# Patient Record
Sex: MALE | Race: BLACK OR AFRICAN AMERICAN | NOT HISPANIC OR LATINO | Employment: FULL TIME | URBAN - METROPOLITAN AREA
[De-identification: names, ages, dates, MRNs, and addresses within clinical notes are randomized per-mention and may not be internally consistent; named-entity substitution may affect disease eponyms.]

---

## 2020-12-03 ENCOUNTER — LAB VISIT (OUTPATIENT)
Dept: PRIMARY CARE CLINIC | Facility: OTHER | Age: 35
End: 2020-12-03
Attending: INTERNAL MEDICINE
Payer: COMMERCIAL

## 2020-12-03 DIAGNOSIS — Z03.818 ENCOUNTER FOR OBSERVATION FOR SUSPECTED EXPOSURE TO OTHER BIOLOGICAL AGENTS RULED OUT: Primary | ICD-10-CM

## 2020-12-03 PROCEDURE — U0003 INFECTIOUS AGENT DETECTION BY NUCLEIC ACID (DNA OR RNA); SEVERE ACUTE RESPIRATORY SYNDROME CORONAVIRUS 2 (SARS-COV-2) (CORONAVIRUS DISEASE [COVID-19]), AMPLIFIED PROBE TECHNIQUE, MAKING USE OF HIGH THROUGHPUT TECHNOLOGIES AS DESCRIBED BY CMS-2020-01-R: HCPCS

## 2020-12-06 LAB — SARS-COV-2 RNA RESP QL NAA+PROBE: NOT DETECTED

## 2021-03-11 ENCOUNTER — IMMUNIZATION (OUTPATIENT)
Dept: PRIMARY CARE CLINIC | Facility: CLINIC | Age: 36
End: 2021-03-11

## 2021-03-11 DIAGNOSIS — Z23 NEED FOR VACCINATION: Primary | ICD-10-CM

## 2021-03-11 PROCEDURE — 91301 COVID-19, MRNA, LNP-S, PF, 100 MCG/0.5 ML DOSE VACCINE: CPT | Mod: S$GLB,,, | Performed by: FAMILY MEDICINE

## 2021-03-11 PROCEDURE — 0011A COVID-19, MRNA, LNP-S, PF, 100 MCG/0.5 ML DOSE VACCINE: ICD-10-PCS | Mod: CV19,S$GLB,, | Performed by: FAMILY MEDICINE

## 2021-03-11 PROCEDURE — 0011A COVID-19, MRNA, LNP-S, PF, 100 MCG/0.5 ML DOSE VACCINE: CPT | Mod: CV19,S$GLB,, | Performed by: FAMILY MEDICINE

## 2021-03-11 PROCEDURE — 91301 COVID-19, MRNA, LNP-S, PF, 100 MCG/0.5 ML DOSE VACCINE: ICD-10-PCS | Mod: S$GLB,,, | Performed by: FAMILY MEDICINE

## 2021-04-15 ENCOUNTER — IMMUNIZATION (OUTPATIENT)
Dept: PRIMARY CARE CLINIC | Facility: CLINIC | Age: 36
End: 2021-04-15

## 2021-04-15 DIAGNOSIS — Z23 NEED FOR VACCINATION: Primary | ICD-10-CM

## 2021-04-15 PROCEDURE — 91301 COVID-19, MRNA, LNP-S, PF, 100 MCG/0.5 ML DOSE VACCINE: CPT | Mod: S$GLB,,, | Performed by: FAMILY MEDICINE

## 2021-04-15 PROCEDURE — 0012A COVID-19, MRNA, LNP-S, PF, 100 MCG/0.5 ML DOSE VACCINE: ICD-10-PCS | Mod: CV19,S$GLB,, | Performed by: FAMILY MEDICINE

## 2021-04-15 PROCEDURE — 0012A COVID-19, MRNA, LNP-S, PF, 100 MCG/0.5 ML DOSE VACCINE: CPT | Mod: CV19,S$GLB,, | Performed by: FAMILY MEDICINE

## 2021-04-15 PROCEDURE — 91301 COVID-19, MRNA, LNP-S, PF, 100 MCG/0.5 ML DOSE VACCINE: ICD-10-PCS | Mod: S$GLB,,, | Performed by: FAMILY MEDICINE

## 2022-02-07 ENCOUNTER — HOSPITAL ENCOUNTER (EMERGENCY)
Facility: HOSPITAL | Age: 37
Discharge: HOME | End: 2022-02-07
Attending: EMERGENCY MEDICINE

## 2022-02-07 ENCOUNTER — OFFICE VISIT (OUTPATIENT)
Dept: OPHTHALMOLOGY | Facility: CLINIC | Age: 37
End: 2022-02-07

## 2022-02-07 VITALS
WEIGHT: 130.75 LBS | RESPIRATION RATE: 18 BRPM | DIASTOLIC BLOOD PRESSURE: 96 MMHG | TEMPERATURE: 99 F | BODY MASS INDEX: 19.88 KG/M2 | SYSTOLIC BLOOD PRESSURE: 144 MMHG | OXYGEN SATURATION: 98 % | HEART RATE: 104 BPM

## 2022-02-07 DIAGNOSIS — H16.002 CORNEAL ULCER OF LEFT EYE: ICD-10-CM

## 2022-02-07 DIAGNOSIS — H16.8 BACTERIAL KERATITIS: ICD-10-CM

## 2022-02-07 DIAGNOSIS — H16.8 BACTERIAL KERATITIS: Primary | ICD-10-CM

## 2022-02-07 DIAGNOSIS — R03.0 ELEVATED BLOOD PRESSURE READING WITHOUT DIAGNOSIS OF HYPERTENSION: ICD-10-CM

## 2022-02-07 PROCEDURE — 99999 PR PBB SHADOW E&M-EST. PATIENT-LVL II: CPT | Mod: PBBFAC,,, | Performed by: STUDENT IN AN ORGANIZED HEALTH CARE EDUCATION/TRAINING PROGRAM

## 2022-02-07 PROCEDURE — 99204 OFFICE O/P NEW MOD 45 MIN: CPT | Mod: S$GLB,,, | Performed by: STUDENT IN AN ORGANIZED HEALTH CARE EDUCATION/TRAINING PROGRAM

## 2022-02-07 PROCEDURE — 87070 CULTURE OTHR SPECIMN AEROBIC: CPT | Performed by: STUDENT IN AN ORGANIZED HEALTH CARE EDUCATION/TRAINING PROGRAM

## 2022-02-07 PROCEDURE — 87186 SC STD MICRODIL/AGAR DIL: CPT | Performed by: STUDENT IN AN ORGANIZED HEALTH CARE EDUCATION/TRAINING PROGRAM

## 2022-02-07 PROCEDURE — 99282 EMERGENCY DEPT VISIT SF MDM: CPT | Mod: ER

## 2022-02-07 PROCEDURE — 25000003 PHARM REV CODE 250: Mod: ER | Performed by: EMERGENCY MEDICINE

## 2022-02-07 PROCEDURE — 87077 CULTURE AEROBIC IDENTIFY: CPT | Performed by: STUDENT IN AN ORGANIZED HEALTH CARE EDUCATION/TRAINING PROGRAM

## 2022-02-07 PROCEDURE — 99999 PR PBB SHADOW E&M-EST. PATIENT-LVL II: ICD-10-PCS | Mod: PBBFAC,,, | Performed by: STUDENT IN AN ORGANIZED HEALTH CARE EDUCATION/TRAINING PROGRAM

## 2022-02-07 PROCEDURE — 99204 PR OFFICE/OUTPT VISIT, NEW, LEVL IV, 45-59 MIN: ICD-10-PCS | Mod: S$GLB,,, | Performed by: STUDENT IN AN ORGANIZED HEALTH CARE EDUCATION/TRAINING PROGRAM

## 2022-02-07 RX ORDER — MOXIFLOXACIN 5 MG/ML
1 SOLUTION/ DROPS OPHTHALMIC
Qty: 3 ML | Refills: 0 | Status: SHIPPED | OUTPATIENT
Start: 2022-02-07 | End: 2022-02-07

## 2022-02-07 RX ORDER — TETRACAINE HYDROCHLORIDE 5 MG/ML
2 SOLUTION OPHTHALMIC
Status: DISCONTINUED | OUTPATIENT
Start: 2022-02-07 | End: 2022-02-07

## 2022-02-07 RX ORDER — ERYTHROMYCIN 5 MG/G
OINTMENT OPHTHALMIC
Status: COMPLETED | OUTPATIENT
Start: 2022-02-07 | End: 2022-02-07

## 2022-02-07 RX ORDER — POLYMYXIN B SULFATE AND TRIMETHOPRIM 1; 10000 MG/ML; [USP'U]/ML
1 SOLUTION OPHTHALMIC
Qty: 10 ML | Refills: 0 | Status: SHIPPED | OUTPATIENT
Start: 2022-02-07

## 2022-02-07 RX ORDER — MOXIFLOXACIN 5 MG/ML
1 SOLUTION/ DROPS OPHTHALMIC
Qty: 3 ML | Refills: 0 | Status: SHIPPED | OUTPATIENT
Start: 2022-02-07

## 2022-02-07 RX ORDER — MOXIFLOXACIN 5 MG/ML
1 SOLUTION/ DROPS OPHTHALMIC
Qty: 3 ML | Refills: 0 | Status: SHIPPED | OUTPATIENT
Start: 2022-02-07 | End: 2022-02-07 | Stop reason: SDUPTHER

## 2022-02-07 RX ADMIN — FLUORESCEIN SODIUM AND BENOXINATE HYDROCHLORIDE 1 DROP: 4; 2.5 SOLUTION OPHTHALMIC at 10:02

## 2022-02-07 RX ADMIN — ERYTHROMYCIN 1 INCH: 5 OINTMENT OPHTHALMIC at 10:02

## 2022-02-07 NOTE — ED PROVIDER NOTES
Encounter Date: 2/7/2022       History     Chief Complaint   Patient presents with    Eye Pain     Left eye pain/redness/drainage since Friday     The history is provided by the patient.   Eye Pain   This is a new problem. The current episode started several days ago. The problem occurs constantly. The problem has been unchanged. The left eye is affected. There was no injury mechanism. The pain is at a severity of 3/10. There is no history of trauma to the eye. There is no known exposure to pink eye. He wears contacts. Associated symptoms include blurred vision, discharge, foreign body sensation and eye redness. Pertinent negatives include no numbness, no decreased vision, no double vision, no photophobia, no nausea, no vomiting, no tingling, no weakness and no itching. He has tried nothing for the symptoms. The treatment provided no relief.     Review of patient's allergies indicates:  No Known Allergies  Past Medical History:   Diagnosis Date    Asthma      No past surgical history on file.  Family History   Problem Relation Age of Onset    No Known Problems Mother     No Known Problems Father      Social History     Tobacco Use    Smoking status: Never Smoker   Substance Use Topics    Alcohol use: Yes     Alcohol/week: 7.0 standard drinks     Types: 7 Glasses of wine per week    Drug use: No     Review of Systems   Eyes: Positive for blurred vision, pain, discharge and redness. Negative for double vision and photophobia.   Gastrointestinal: Negative for nausea and vomiting.   Skin: Negative for itching.   Neurological: Negative for tingling, weakness and numbness.   All other systems reviewed and are negative.      Physical Exam     Initial Vitals [02/07/22 0959]   BP Pulse Resp Temp SpO2   (!) 144/96 104 18 98.5 °F (36.9 °C) 98 %      MAP       --         Physical Exam    Nursing note and vitals reviewed.  Constitutional: He appears well-developed and well-nourished.   HENT:   Head: Normocephalic and  atraumatic.   Mouth/Throat: Oropharynx is clear and moist.   Eyes: Pupils are equal, round, and reactive to light. No foreign body present in the right eye. Left eye exhibits exudate. No foreign body present in the left eye. Right conjunctiva is not injected. Left conjunctiva is injected. Right eye exhibits abnormal extraocular motion. Left eye exhibits normal extraocular motion.   Fundoscopic exam:       The left eye shows exudate.   Slit lamp exam:       The left eye shows fluorescein uptake.   Neck: Neck supple.   Normal range of motion.  Cardiovascular: Normal rate, regular rhythm and normal heart sounds.   Pulmonary/Chest: Breath sounds normal.   Abdominal: Abdomen is soft. Bowel sounds are normal.   Musculoskeletal:         General: Normal range of motion.      Cervical back: Normal range of motion and neck supple.     Neurological: He is alert and oriented to person, place, and time. He has normal strength.   Skin: Skin is warm and dry.   Psychiatric: He has a normal mood and affect. Thought content normal.         ED Course   Procedures  Labs Reviewed - No data to display       Imaging Results    None     10:58 AM Opth Consult discussed case with Dr Butt will see pt in clinic at 1PM at [facility]. Pt agrees c plan and will follow up at 1 PM at this location. Pt states he can make the appointment.    11:00 AM - Counseling: Spoke with the patient and discussed todays findings, in addition to providing specific details for the plan of care and counseling regarding the diagnosis and prognosis. Questions are answered at this time.    Pre-hypertension/Hypertension: The pt has been informed that they may have pre-hypertension or hypertension based on a blood pressure reading in the ED. I recommend that the pt call the PCP listed on their discharge instructions or a physician of their choice this week to arrange f/u for further evaluation of possible pre-hypertension or hypertension.         Medications    erythromycin 5 mg/gram (0.5 %) ophthalmic ointment (1 inch Left Eye Given 2/7/22 1027)   fluorescein-benoxinate 0.25-0.4% ophthalmic solution 1 drop (1 drop Left Eye Given 2/7/22 1027)                          Clinical Impression:   Final diagnoses:  [H16.8] Bacterial keratitis (Primary)  [R03.0] Elevated blood pressure reading without diagnosis of hypertension          ED Disposition Condition    Discharge Stable        ED Prescriptions     None        Follow-up Information     Follow up With Specialties Details Why Contact Info    Opthalmology  Today 1 PM [facility]  [address] [facility]  [phone]           Hussein Billings MD  02/07/22 7555

## 2022-02-07 NOTE — Clinical Note
Ria Mike accompanied their father to the emergency department on 2/7/2022. They may return to school on 02/08/2022.       If you have any questions or concerns, please don't hesitate to call.        RN

## 2022-02-07 NOTE — PROGRESS NOTES
HPI     Pt in today with a problem with his left eye. Pt states his left eye   turned red on Friday and he noticed the white dot on Sunday. Pt states his   left eye has been hurting since Friday. Pt states he did flush his eye. Pt   states he does sleep in his contacts.     Last edited by Cris Gomez on 2/7/2022  1:05 PM. (History)            Assessment /Plan     For exam results, see Encounter Report.    Bacterial keratitis- not involving visual axis. However, epi defect is 3mm x 1.5mm. will treat aggressively with moxifloxacin and polytrim. If no improvement, low threshold for cornea consult and initiation of fortified ABx as patient is monocular.  Start Moxi every 5 minutes for the first 30 mintues then every 30 minutes until bed   Then starting tomorrow 2/8/22 Moxi every hour while awake  Start Polymyxin B with the same drop regiment   Culture taken today of corneal ulcer    *Hand written drop instructions given,  personal cell given  Discussed with patient to discontinue contact lens use indefinitely, discussed importance of drop regimen and follow up care  Patient states he threw his contact lens and contact case away so we were unable to culture it     Return to clinic tomorrow 2/8/22 for R/C

## 2022-02-07 NOTE — Clinical Note
Serenity Pack accompanied their father to the emergency department on 2/7/2022. They may return to school on 02/08/2022.      If you have any questions or concerns, please don't hesitate to call.       RN

## 2022-02-07 NOTE — Clinical Note
Serenity Pack accompanied their father to the emergency department on 2/7/2022. They may return to school on 02/08/2022.      If you have any questions or concerns, please don't hesitate to call.       MD

## 2022-02-07 NOTE — Clinical Note
Ria Mckeon accompanied their father to the emergency department on 2/7/2022. They may return to school on 02/08/2022.      If you have any questions or concerns, please don't hesitate to call.       MD

## 2022-02-08 DIAGNOSIS — H16.002 CORNEAL ULCER OF LEFT EYE: Primary | ICD-10-CM

## 2022-02-08 DIAGNOSIS — H16.8 BACTERIAL KERATITIS: ICD-10-CM

## 2022-02-08 NOTE — TELEPHONE ENCOUNTER
Spoke with patient. He called at 7am stating that his vision has worsened despite good drop compliance. He no longer feels safe to drive. I instructed him to go to [facility] to be seen by cornea and started on fortified ABx. He states that he can make it there for 10am on 2/8/2022. Stressed importance of follow up care and possibility of permanent vision loss. A referral and chart notes were faxed to [facility]. Spoke with Shira at [facility] to confirm appointment for this morning.

## 2022-02-10 LAB — BACTERIA SPEC AEROBE CULT: ABNORMAL
